# Patient Record
(demographics unavailable — no encounter records)

---

## 2024-12-11 NOTE — PHYSICAL EXAM
[FreeTextEntry1] : SC present for exam  [Normal] : supple, no neck mass and thyroid not enlarged [Normal Neck Lymph Nodes] : normal neck lymph nodes  [Normal Supraclavicular Lymph Nodes] : normal supraclavicular lymph nodes [Normal Axillary Lymph Nodes] : normal axillary lymph nodes [Normal] : oriented to person, place and time, with appropriate affect [de-identified] : Normal S1, S2. regular rate and rhythm. [de-identified] : Complete breast exam performed in supine and upright positions. No palpable masses, tenderness, nipple discharge, inversion, deviation or enlarged axillary or supraclavicular lymph nodes bilaterally. [de-identified] : Clear breath sounds bilaterally, normal respiratory effort.

## 2024-12-11 NOTE — ASSESSMENT
[FreeTextEntry1] : Imp: s/p left breast duct excision on 6/18/2020 with a final path of a papilloma.  B/L MMG/US on 2/16/21 revealed no evidence of malignancy, benign intramammary lymph node noted along the 9:00 axis of the right breast , 13cmfn, simple cyst vs. oil cyst 9:00 left breast. BIRADS 2. Left breast pain in the 9:00 axis- No suspicious findings on exam   Plan: MMG/US now ? Continue yearly breast surveillance.   All medical entries were at my, Dr. Juan Pablo Lane, direction. I have reviewed the chart and agree that the record accurately reflects my personal performance of the history, physical exam, assessment and plan. Our office Nurse Practitioner was present of the duration of the office visit.

## 2024-12-11 NOTE — HISTORY OF PRESENT ILLNESS
[de-identified] : 60 year-old female presents for her f/u visit.  She is s/p left breast duct excision on 2020 with a final path of a papilloma.  B/L MMG/US on Dec 2022- No evidence of malignancy BI-RADS 2  Denies palpable breast masses or nipple discharge. Continues to complain of left breast pain  PERTINENT HISTORY: She has a hx of intermittent watery occasionally red tinted left nipple discharge.   Pathology report of the left breast US-guided needle biopsy dated 20 indicated that the target at 12:00, retroareolar axis is benign. MMG (19): Breast imaging performed failed to identify any abnormalities in the left breast. There are stable prominent right axillary cell and axillary lymph nodes. BIRADS 2: Benign   Family history includes only a paternal aunt who  of breast cancer in her 80's.

## 2025-01-09 NOTE — HISTORY OF PRESENT ILLNESS
[de-identified] : 60 year-old female presents for her f/u visit.    FH: Maternal aunt in her 80's  She has a hx of intermittent watery occasionally red tinted left nipple discharge.  Pathology report of the left breast US-guided needle biopsy dated 2/25/20 indicated that the target at 12:00, retroareolar axis is benign. She is s/p left breast duct excision on 6/18/2020 with a final path of a papilloma.  B/L MMG/US on 12/30/2024 shows prominent lymph nodes are again noted in both axilla which are considered benign. There is a biopsy marker adjacent to a stable lymph node in the right axillary tail. B2  Denies palpable breast masses or nipple discharge. Continues to complain of left breast pain

## 2025-01-09 NOTE — PHYSICAL EXAM
[FreeTextEntry1] : SC present for exam  [de-identified] : Normal S1, S2. regular rate and rhythm. [de-identified] : Complete breast exam performed in supine and upright positions. No palpable masses, tenderness, nipple discharge, inversion, deviation or enlarged axillary or supraclavicular lymph nodes bilaterally. [de-identified] : Clear breath sounds bilaterally, normal respiratory effort.

## 2025-01-09 NOTE — PHYSICAL EXAM
[FreeTextEntry1] : SC present for exam  [de-identified] : Normal S1, S2. regular rate and rhythm. [de-identified] : Complete breast exam performed in supine and upright positions. No palpable masses, tenderness, nipple discharge, inversion, deviation or enlarged axillary or supraclavicular lymph nodes bilaterally. [de-identified] : Clear breath sounds bilaterally, normal respiratory effort.

## 2025-01-09 NOTE — HISTORY OF PRESENT ILLNESS
[de-identified] : 60 year-old female presents for her f/u visit.    FH: Maternal aunt in her 80's  She has a hx of intermittent watery occasionally red tinted left nipple discharge.  Pathology report of the left breast US-guided needle biopsy dated 2/25/20 indicated that the target at 12:00, retroareolar axis is benign. She is s/p left breast duct excision on 6/18/2020 with a final path of a papilloma.  B/L MMG/US on 12/30/2024 shows prominent lymph nodes are again noted in both axilla which are considered benign. There is a biopsy marker adjacent to a stable lymph node in the right axillary tail. B2  Denies palpable breast masses or nipple discharge. Continues to complain of left breast pain

## 2025-01-09 NOTE — PHYSICAL EXAM
[FreeTextEntry1] : SC present for exam  [de-identified] : Normal S1, S2. regular rate and rhythm. [de-identified] : Complete breast exam performed in supine and upright positions. No palpable masses, tenderness, nipple discharge, inversion, deviation or enlarged axillary or supraclavicular lymph nodes bilaterally. [de-identified] : Clear breath sounds bilaterally, normal respiratory effort.

## 2025-01-09 NOTE — HISTORY OF PRESENT ILLNESS
[de-identified] : 60 year-old female presents for her f/u visit.    FH: Maternal aunt in her 80's  She has a hx of intermittent watery occasionally red tinted left nipple discharge.  Pathology report of the left breast US-guided needle biopsy dated 2/25/20 indicated that the target at 12:00, retroareolar axis is benign. She is s/p left breast duct excision on 6/18/2020 with a final path of a papilloma.  B/L MMG/US on 12/30/2024 shows prominent lymph nodes are again noted in both axilla which are considered benign. There is a biopsy marker adjacent to a stable lymph node in the right axillary tail. B2  Denies palpable breast masses or nipple discharge. Continues to complain of left breast pain

## 2025-01-09 NOTE — ASSESSMENT
[FreeTextEntry1] : Imp: s/p left breast duct excision on 6/18/2020 with a final path of a papilloma.  B/L MMG/US on 2/16/21 revealed no evidence of malignancy, benign intramammary lymph node noted along the 9:00 axis of the right breast, 13cmfn, simple cyst vs. oil cyst 9:00 left breast. BIRADS 2. No suspicious findings on exam   Plan: MMG/US 12/2025 Continue yearly breast surveillance.   All medical entries were at my, Dr. Juan Pablo Lane, direction. I have reviewed the chart and agree that the record accurately reflects my personal performance of the history, physical exam, assessment and plan. Our office Nurse Practitioner was present of the duration of the office visit.